# Patient Record
Sex: FEMALE | Race: WHITE | NOT HISPANIC OR LATINO | URBAN - METROPOLITAN AREA
[De-identification: names, ages, dates, MRNs, and addresses within clinical notes are randomized per-mention and may not be internally consistent; named-entity substitution may affect disease eponyms.]

---

## 2023-06-12 ENCOUNTER — HOSPITAL ENCOUNTER (OUTPATIENT)
Dept: RADIOLOGY | Facility: MEDICAL CENTER | Age: 43
End: 2023-06-12
Attending: CHIROPRACTOR
Payer: COMMERCIAL

## 2023-06-12 DIAGNOSIS — M25.561 CHRONIC PAIN OF RIGHT KNEE: ICD-10-CM

## 2023-06-12 DIAGNOSIS — G89.29 CHRONIC PAIN OF RIGHT KNEE: ICD-10-CM

## 2023-06-12 PROCEDURE — 73721 MRI JNT OF LWR EXTRE W/O DYE: CPT | Mod: RT

## 2024-07-15 ENCOUNTER — OFFICE VISIT (OUTPATIENT)
Dept: MEDICAL GROUP | Facility: PHYSICIAN GROUP | Age: 44
End: 2024-07-15
Payer: COMMERCIAL

## 2024-07-15 ENCOUNTER — HOSPITAL ENCOUNTER (OUTPATIENT)
Dept: LAB | Facility: MEDICAL CENTER | Age: 44
End: 2024-07-15
Attending: NURSE PRACTITIONER
Payer: COMMERCIAL

## 2024-07-15 VITALS
TEMPERATURE: 98.5 F | HEART RATE: 89 BPM | BODY MASS INDEX: 51.91 KG/M2 | HEIGHT: 63 IN | WEIGHT: 293 LBS | OXYGEN SATURATION: 96 % | DIASTOLIC BLOOD PRESSURE: 112 MMHG | SYSTOLIC BLOOD PRESSURE: 200 MMHG

## 2024-07-15 DIAGNOSIS — K90.41 GLUTEN INTOLERANCE: ICD-10-CM

## 2024-07-15 DIAGNOSIS — E66.01 MORBID (SEVERE) OBESITY DUE TO EXCESS CALORIES (HCC): ICD-10-CM

## 2024-07-15 DIAGNOSIS — R53.83 OTHER FATIGUE: ICD-10-CM

## 2024-07-15 DIAGNOSIS — Z76.89 ENCOUNTER TO ESTABLISH CARE: ICD-10-CM

## 2024-07-15 DIAGNOSIS — E66.01 MORBID OBESITY WITH BMI OF 50.0-59.9, ADULT (HCC): ICD-10-CM

## 2024-07-15 DIAGNOSIS — I10 PRIMARY HYPERTENSION: ICD-10-CM

## 2024-07-15 DIAGNOSIS — Z11.4 SCREENING FOR HIV (HUMAN IMMUNODEFICIENCY VIRUS): ICD-10-CM

## 2024-07-15 DIAGNOSIS — K21.9 GASTROESOPHAGEAL REFLUX DISEASE WITHOUT ESOPHAGITIS: ICD-10-CM

## 2024-07-15 DIAGNOSIS — D64.9 ANEMIA, UNSPECIFIED TYPE: ICD-10-CM

## 2024-07-15 DIAGNOSIS — R01.1 MURMUR: ICD-10-CM

## 2024-07-15 DIAGNOSIS — Z12.11 SCREENING FOR COLON CANCER: ICD-10-CM

## 2024-07-15 DIAGNOSIS — Z11.59 NEED FOR HEPATITIS C SCREENING TEST: ICD-10-CM

## 2024-07-15 DIAGNOSIS — Z80.0 FAMILY HISTORY OF COLON CANCER: ICD-10-CM

## 2024-07-15 DIAGNOSIS — R63.5 WEIGHT GAIN: ICD-10-CM

## 2024-07-15 DIAGNOSIS — R06.02 SHORTNESS OF BREATH: ICD-10-CM

## 2024-07-15 DIAGNOSIS — Z01.84 IMMUNITY STATUS TESTING: ICD-10-CM

## 2024-07-15 LAB
25(OH)D3 SERPL-MCNC: 9 NG/ML (ref 30–100)
ALBUMIN SERPL BCP-MCNC: 4.1 G/DL (ref 3.2–4.9)
ALBUMIN/GLOB SERPL: 1.6 G/DL
ALP SERPL-CCNC: 50 U/L (ref 30–99)
ALT SERPL-CCNC: 16 U/L (ref 2–50)
ANION GAP SERPL CALC-SCNC: 12 MMOL/L (ref 7–16)
AST SERPL-CCNC: 20 U/L (ref 12–45)
BASOPHILS # BLD AUTO: 0.6 % (ref 0–1.8)
BASOPHILS # BLD: 0.06 K/UL (ref 0–0.12)
BILIRUB SERPL-MCNC: 0.4 MG/DL (ref 0.1–1.5)
BUN SERPL-MCNC: 14 MG/DL (ref 8–22)
CALCIUM ALBUM COR SERPL-MCNC: 9.9 MG/DL (ref 8.5–10.5)
CALCIUM SERPL-MCNC: 10 MG/DL (ref 8.5–10.5)
CHLORIDE SERPL-SCNC: 105 MMOL/L (ref 96–112)
CHOLEST SERPL-MCNC: 203 MG/DL (ref 100–199)
CO2 SERPL-SCNC: 23 MMOL/L (ref 20–33)
CREAT SERPL-MCNC: 0.7 MG/DL (ref 0.5–1.4)
CREAT UR-MCNC: 71.88 MG/DL
EOSINOPHIL # BLD AUTO: 0.15 K/UL (ref 0–0.51)
EOSINOPHIL NFR BLD: 1.4 % (ref 0–6.9)
ERYTHROCYTE [DISTWIDTH] IN BLOOD BY AUTOMATED COUNT: 55.1 FL (ref 35.9–50)
EST. AVERAGE GLUCOSE BLD GHB EST-MCNC: 105 MG/DL
FERRITIN SERPL-MCNC: 111 NG/ML (ref 10–291)
FOLATE SERPL-MCNC: 3.5 NG/ML
GFR SERPLBLD CREATININE-BSD FMLA CKD-EPI: 110 ML/MIN/1.73 M 2
GLOBULIN SER CALC-MCNC: 2.5 G/DL (ref 1.9–3.5)
GLUCOSE SERPL-MCNC: 100 MG/DL (ref 65–99)
HBA1C MFR BLD: 5.3 % (ref 4–5.6)
HCT VFR BLD AUTO: 46.2 % (ref 37–47)
HCV AB SER QL: NORMAL
HDLC SERPL-MCNC: 44 MG/DL
HGB BLD-MCNC: 14.9 G/DL (ref 12–16)
HIV 1+2 AB+HIV1 P24 AG SERPL QL IA: NORMAL
IMM GRANULOCYTES # BLD AUTO: 0.03 K/UL (ref 0–0.11)
IMM GRANULOCYTES NFR BLD AUTO: 0.3 % (ref 0–0.9)
IRON SATN MFR SERPL: 23 % (ref 15–55)
IRON SERPL-MCNC: 81 UG/DL (ref 40–170)
LDLC SERPL CALC-MCNC: 131 MG/DL
LYMPHOCYTES # BLD AUTO: 2.8 K/UL (ref 1–4.8)
LYMPHOCYTES NFR BLD: 26.4 % (ref 22–41)
MCH RBC QN AUTO: 33.8 PG (ref 27–33)
MCHC RBC AUTO-ENTMCNC: 32.3 G/DL (ref 32.2–35.5)
MCV RBC AUTO: 104.8 FL (ref 81.4–97.8)
MICROALBUMIN UR-MCNC: <1.2 MG/DL
MICROALBUMIN/CREAT UR: NORMAL MG/G (ref 0–30)
MONOCYTES # BLD AUTO: 0.8 K/UL (ref 0–0.85)
MONOCYTES NFR BLD AUTO: 7.5 % (ref 0–13.4)
NEUTROPHILS # BLD AUTO: 6.76 K/UL (ref 1.82–7.42)
NEUTROPHILS NFR BLD: 63.8 % (ref 44–72)
NRBC # BLD AUTO: 0 K/UL
NRBC BLD-RTO: 0 /100 WBC (ref 0–0.2)
PLATELET # BLD AUTO: 198 K/UL (ref 164–446)
PMV BLD AUTO: 11.4 FL (ref 9–12.9)
POTASSIUM SERPL-SCNC: 4.3 MMOL/L (ref 3.6–5.5)
PROT SERPL-MCNC: 6.6 G/DL (ref 6–8.2)
RBC # BLD AUTO: 4.41 M/UL (ref 4.2–5.4)
SODIUM SERPL-SCNC: 140 MMOL/L (ref 135–145)
TIBC SERPL-MCNC: 356 UG/DL (ref 250–450)
TRIGL SERPL-MCNC: 139 MG/DL (ref 0–149)
TSH SERPL DL<=0.005 MIU/L-ACNC: 1.58 UIU/ML (ref 0.38–5.33)
UIBC SERPL-MCNC: 275 UG/DL (ref 110–370)
VIT B12 SERPL-MCNC: 398 PG/ML (ref 211–911)
WBC # BLD AUTO: 10.6 K/UL (ref 4.8–10.8)

## 2024-07-15 PROCEDURE — 83036 HEMOGLOBIN GLYCOSYLATED A1C: CPT

## 2024-07-15 PROCEDURE — 82043 UR ALBUMIN QUANTITATIVE: CPT

## 2024-07-15 PROCEDURE — 87389 HIV-1 AG W/HIV-1&-2 AB AG IA: CPT

## 2024-07-15 PROCEDURE — 82306 VITAMIN D 25 HYDROXY: CPT

## 2024-07-15 PROCEDURE — 82607 VITAMIN B-12: CPT

## 2024-07-15 PROCEDURE — 82728 ASSAY OF FERRITIN: CPT

## 2024-07-15 PROCEDURE — 86803 HEPATITIS C AB TEST: CPT

## 2024-07-15 PROCEDURE — 82570 ASSAY OF URINE CREATININE: CPT

## 2024-07-15 PROCEDURE — 80061 LIPID PANEL: CPT

## 2024-07-15 PROCEDURE — 83540 ASSAY OF IRON: CPT

## 2024-07-15 PROCEDURE — 86706 HEP B SURFACE ANTIBODY: CPT

## 2024-07-15 PROCEDURE — 80053 COMPREHEN METABOLIC PANEL: CPT

## 2024-07-15 PROCEDURE — 3080F DIAST BP >= 90 MM HG: CPT | Performed by: NURSE PRACTITIONER

## 2024-07-15 PROCEDURE — 99204 OFFICE O/P NEW MOD 45 MIN: CPT | Performed by: NURSE PRACTITIONER

## 2024-07-15 PROCEDURE — 86258 DGP ANTIBODY EACH IG CLASS: CPT

## 2024-07-15 PROCEDURE — 85025 COMPLETE CBC W/AUTO DIFF WBC: CPT

## 2024-07-15 PROCEDURE — 84443 ASSAY THYROID STIM HORMONE: CPT

## 2024-07-15 PROCEDURE — 86364 TISS TRNSGLTMNASE EA IG CLAS: CPT | Mod: 91

## 2024-07-15 PROCEDURE — 82746 ASSAY OF FOLIC ACID SERUM: CPT

## 2024-07-15 PROCEDURE — 93000 ELECTROCARDIOGRAM COMPLETE: CPT | Performed by: NURSE PRACTITIONER

## 2024-07-15 PROCEDURE — 3077F SYST BP >= 140 MM HG: CPT | Performed by: NURSE PRACTITIONER

## 2024-07-15 PROCEDURE — 36415 COLL VENOUS BLD VENIPUNCTURE: CPT

## 2024-07-15 PROCEDURE — 83550 IRON BINDING TEST: CPT

## 2024-07-15 RX ORDER — AMLODIPINE BESYLATE AND BENAZEPRIL HYDROCHLORIDE 5; 10 MG/1; MG/1
1 CAPSULE ORAL DAILY
Qty: 30 CAPSULE | Refills: 2 | Status: SHIPPED | OUTPATIENT
Start: 2024-07-15

## 2024-07-15 RX ORDER — LANSOPRAZOLE 30 MG/1
30 CAPSULE, DELAYED RELEASE ORAL DAILY
COMMUNITY

## 2024-07-15 RX ORDER — CALCIUM CARBONATE 500 MG/1
TABLET, CHEWABLE ORAL DAILY
COMMUNITY

## 2024-07-16 LAB
GLIADIN IGA SER IA-ACNC: <0.72 FLU (ref 0–4.99)
HBV SURFACE AB SERPL IA-ACNC: <3.5 MIU/ML (ref 0–10)
TTG IGA SER IA-ACNC: <1.02 FLU (ref 0–4.99)

## 2024-07-17 LAB
GLIADIN IGG SER IA-ACNC: <0.56 FLU (ref 0–4.99)
TTG IGG SER IA-ACNC: <0.82 FLU (ref 0–4.99)

## 2024-07-22 ENCOUNTER — OFFICE VISIT (OUTPATIENT)
Dept: MEDICAL GROUP | Facility: PHYSICIAN GROUP | Age: 44
End: 2024-07-22
Payer: COMMERCIAL

## 2024-07-22 VITALS
OXYGEN SATURATION: 98 % | DIASTOLIC BLOOD PRESSURE: 112 MMHG | SYSTOLIC BLOOD PRESSURE: 152 MMHG | WEIGHT: 293 LBS | HEIGHT: 62 IN | HEART RATE: 88 BPM | TEMPERATURE: 98 F | BODY MASS INDEX: 53.92 KG/M2

## 2024-07-22 DIAGNOSIS — E53.8 FOLIC ACID DEFICIENCY: ICD-10-CM

## 2024-07-22 DIAGNOSIS — E78.5 DYSLIPIDEMIA: ICD-10-CM

## 2024-07-22 DIAGNOSIS — E55.9 VITAMIN D DEFICIENCY: ICD-10-CM

## 2024-07-22 DIAGNOSIS — R73.01 ELEVATED FASTING GLUCOSE: ICD-10-CM

## 2024-07-22 DIAGNOSIS — I10 PRIMARY HYPERTENSION: ICD-10-CM

## 2024-07-22 PROCEDURE — 3077F SYST BP >= 140 MM HG: CPT | Performed by: NURSE PRACTITIONER

## 2024-07-22 PROCEDURE — 99214 OFFICE O/P EST MOD 30 MIN: CPT | Performed by: NURSE PRACTITIONER

## 2024-07-22 PROCEDURE — 3080F DIAST BP >= 90 MM HG: CPT | Performed by: NURSE PRACTITIONER

## 2024-07-22 RX ORDER — ERGOCALCIFEROL 1.25 MG/1
50000 CAPSULE ORAL
Qty: 12 CAPSULE | Refills: 0 | Status: SHIPPED | OUTPATIENT
Start: 2024-07-22

## 2024-07-22 RX ORDER — FOLIC ACID 1 MG/1
1 TABLET ORAL DAILY
Qty: 90 TABLET | Refills: 0 | Status: SHIPPED | OUTPATIENT
Start: 2024-07-22

## 2024-07-22 ASSESSMENT — PATIENT HEALTH QUESTIONNAIRE - PHQ9: CLINICAL INTERPRETATION OF PHQ2 SCORE: 0

## 2024-07-22 ASSESSMENT — FIBROSIS 4 INDEX: FIB4 SCORE: 1.09

## 2024-08-12 ENCOUNTER — APPOINTMENT (OUTPATIENT)
Dept: CARDIOLOGY | Facility: MEDICAL CENTER | Age: 44
End: 2024-08-12
Attending: NURSE PRACTITIONER
Payer: COMMERCIAL

## 2024-08-23 LAB
GLIADIN IGA SER IA-ACNC: <0.72 FLU (ref 0–4.99)
GLIADIN IGG SER IA-ACNC: <0.56 FLU (ref 0–4.99)
TTG IGG SER IA-ACNC: <0.82 FLU (ref 0–4.99)

## 2024-08-26 ENCOUNTER — APPOINTMENT (OUTPATIENT)
Dept: CARDIOLOGY | Facility: MEDICAL CENTER | Age: 44
End: 2024-08-26
Attending: NURSE PRACTITIONER
Payer: COMMERCIAL

## 2024-08-26 ENCOUNTER — TELEPHONE (OUTPATIENT)
Dept: HEALTH INFORMATION MANAGEMENT | Facility: OTHER | Age: 44
End: 2024-08-26

## 2024-08-26 DIAGNOSIS — R01.1 MURMUR: ICD-10-CM

## 2024-08-26 LAB
LV EJECT FRACT  99904: 58
LV EJECT FRACT MOD 2C 99903: 67.29
LV EJECT FRACT MOD 4C 99902: 47.08
LV EJECT FRACT MOD BP 99901: 58.31

## 2024-08-26 PROCEDURE — 93306 TTE W/DOPPLER COMPLETE: CPT

## 2024-09-05 ENCOUNTER — OFFICE VISIT (OUTPATIENT)
Dept: MEDICAL GROUP | Facility: PHYSICIAN GROUP | Age: 44
End: 2024-09-05
Payer: COMMERCIAL

## 2024-09-05 VITALS
DIASTOLIC BLOOD PRESSURE: 102 MMHG | TEMPERATURE: 98.6 F | OXYGEN SATURATION: 96 % | BODY MASS INDEX: 53.92 KG/M2 | HEIGHT: 62 IN | WEIGHT: 293 LBS | SYSTOLIC BLOOD PRESSURE: 152 MMHG | HEART RATE: 82 BPM

## 2024-09-05 DIAGNOSIS — I10 PRIMARY HYPERTENSION: ICD-10-CM

## 2024-09-05 DIAGNOSIS — I51.7 LEFT VENTRICULAR HYPERTROPHY: ICD-10-CM

## 2024-09-05 PROCEDURE — 3080F DIAST BP >= 90 MM HG: CPT | Performed by: NURSE PRACTITIONER

## 2024-09-05 PROCEDURE — 3077F SYST BP >= 140 MM HG: CPT | Performed by: NURSE PRACTITIONER

## 2024-09-05 PROCEDURE — 99214 OFFICE O/P EST MOD 30 MIN: CPT | Performed by: NURSE PRACTITIONER

## 2024-09-05 RX ORDER — AMLODIPINE AND BENAZEPRIL HYDROCHLORIDE 10; 20 MG/1; MG/1
1 CAPSULE ORAL DAILY
Qty: 30 CAPSULE | Refills: 2 | Status: SHIPPED | OUTPATIENT
Start: 2024-09-05

## 2024-09-05 ASSESSMENT — FIBROSIS 4 INDEX: FIB4 SCORE: 1.09

## 2024-09-05 NOTE — ASSESSMENT & PLAN NOTE
Recent echocardiogram shows left ejection fraction of 58% with moderate left ventricular hypertrophy.  There is also grade 1 diastolic dysfunction.  Discussed and reviewed her results.  Plan to get better control of her blood pressure.

## 2024-09-05 NOTE — ASSESSMENT & PLAN NOTE
Blood pressure today 154/98.  On repeat blood pressure is 152/102. She is taking her amlodipine-benazepril 5-10 mg daily dose at noon with her last dose yesterday.  She has not had her dose today.  She is not checking her blood pressure at home.  Denies chest pain, shortness of breath or dizziness.  She also had an echocardiogram that she would like to discuss and review her results today.  Blood pressure not controlled.  Plan to increase amlodipine-benazepril to 10-20 mg daily.  Plan for MA BP check in 2 weeks and return in 1 month for follow-up.

## 2024-09-05 NOTE — PROGRESS NOTES
Subjective:     CC: hypertension follow-up    HPI:   Stacy presents today with the following:    Hypertension  Blood pressure today 154/98.  On repeat blood pressure is 152/102. She is taking her amlodipine-benazepril 5-10 mg daily dose at noon with her last dose yesterday.  She has not had her dose today.  She is not checking her blood pressure at home.  Denies chest pain, shortness of breath or dizziness.  She also had an echocardiogram that she would like to discuss and review her results today.  Blood pressure not controlled.  Plan to increase amlodipine-benazepril to 10-20 mg daily.  Plan for MA BP check in 2 weeks and return in 1 month for follow-up.    Left ventricular hypertrophy  Recent echocardiogram shows left ejection fraction of 58% with moderate left ventricular hypertrophy.  There is also grade 1 diastolic dysfunction.  Discussed and reviewed her results.  Plan to get better control of her blood pressure.      Past Medical History:   Diagnosis Date    Hypertension        Social History     Tobacco Use    Smoking status: Never    Smokeless tobacco: Never   Vaping Use    Vaping status: Never Used   Substance Use Topics    Alcohol use: Not Currently    Drug use: Not Currently       Current Outpatient Medications Ordered in Epic   Medication Sig Dispense Refill    amlodipine-benazepril (LOTREL) 10-20 MG per capsule Take 1 Capsule by mouth every day. 30 Capsule 2    ergocalciferol (DRISDOL) 94776 UNIT capsule Take 1 Capsule by mouth every 7 days. 12 Capsule 0    folic acid (FOLVITE) 1 MG Tab Take 1 Tablet by mouth every day. 90 Tablet 0    Ferrous Gluconate (IRON 27 PO) Take  by mouth.      lansoprazole (PREVACID) 30 MG CAPSULE DELAYED RELEASE Take 30 mg by mouth every day.      calcium carbonate (TUMS) 500 MG Chew Tab Chew every day. 8-10/day       No current [facility]-ordered facility-administered medications on file.       Allergies:  Sulfa drugs    Health Maintenance: Reviewed      Objective:     Vital  "signs reviewed  Exam:  BP (!) 152/102 (BP Location: Right arm, Patient Position: Sitting)   Pulse 82   Temp 37 °C (98.6 °F) (Temporal)   Ht 1.575 m (5' 2\")   Wt (!) 136 kg (299 lb)   LMP 08/22/2024 (Approximate)   SpO2 96%   BMI 54.69 kg/m²  Body mass index is 54.69 kg/m².    Gen: Alert and oriented, No apparent distress.  Increased body habitus.  Lungs: Normal effort, CTA bilaterally, no wheezes, rhonchi, or rales  CV: Regular rate and rhythm. No murmurs, rubs, or gallops.      Assessment & Plan:     43 y.o. female with the following -     1. Primary hypertension  Chronic exacerbated problem.  Increasing amlodipine-benazepril to 10-20 mg daily.  She can start taking the new increased dose today by doubling her current prescription.  Would like her to return in 2 weeks for MA BP check and return in 1 month for follow-up.  - amlodipine-benazepril (LOTREL) 10-20 MG per capsule; Take 1 Capsule by mouth every day.  Dispense: 30 Capsule; Refill: 2    2. Left ventricular hypertrophy  Acute uncomplicated problem.  New problem to examiner.  Questions answered.  Recommend blood pressure control.  See #1 above.      Return in about 4 weeks (around 10/3/2024) for Hypertension, MA visit for BP check 2 week.    Please note that this dictation was created using voice recognition software. I have made every reasonable attempt to correct obvious errors, but I expect that there are errors of grammar and possibly content that I did not discover before finalizing the note.        "

## 2024-10-02 ENCOUNTER — OFFICE VISIT (OUTPATIENT)
Dept: MEDICAL GROUP | Facility: PHYSICIAN GROUP | Age: 44
End: 2024-10-02
Payer: COMMERCIAL

## 2024-10-02 VITALS
WEIGHT: 293 LBS | HEIGHT: 62 IN | OXYGEN SATURATION: 94 % | BODY MASS INDEX: 53.92 KG/M2 | SYSTOLIC BLOOD PRESSURE: 146 MMHG | DIASTOLIC BLOOD PRESSURE: 88 MMHG | HEART RATE: 80 BPM | TEMPERATURE: 98.2 F

## 2024-10-02 DIAGNOSIS — I10 PRIMARY HYPERTENSION: ICD-10-CM

## 2024-10-02 PROCEDURE — 3079F DIAST BP 80-89 MM HG: CPT | Performed by: NURSE PRACTITIONER

## 2024-10-02 PROCEDURE — 3077F SYST BP >= 140 MM HG: CPT | Performed by: NURSE PRACTITIONER

## 2024-10-02 PROCEDURE — 99214 OFFICE O/P EST MOD 30 MIN: CPT | Performed by: NURSE PRACTITIONER

## 2024-10-02 RX ORDER — HYDROCHLOROTHIAZIDE 25 MG/1
25 TABLET ORAL DAILY
Qty: 30 TABLET | Refills: 2 | Status: SHIPPED | OUTPATIENT
Start: 2024-10-02

## 2024-10-02 ASSESSMENT — FIBROSIS 4 INDEX: FIB4 SCORE: 1.09

## 2024-10-23 DIAGNOSIS — E53.8 FOLIC ACID DEFICIENCY: ICD-10-CM

## 2024-10-23 RX ORDER — FOLIC ACID 1 MG/1
1 TABLET ORAL DAILY
Qty: 90 TABLET | Refills: 0 | Status: SHIPPED | OUTPATIENT
Start: 2024-10-23

## 2024-11-04 ENCOUNTER — OFFICE VISIT (OUTPATIENT)
Dept: MEDICAL GROUP | Facility: PHYSICIAN GROUP | Age: 44
End: 2024-11-04
Payer: COMMERCIAL

## 2024-11-04 VITALS
OXYGEN SATURATION: 95 % | BODY MASS INDEX: 53 KG/M2 | DIASTOLIC BLOOD PRESSURE: 90 MMHG | HEIGHT: 62 IN | SYSTOLIC BLOOD PRESSURE: 150 MMHG | WEIGHT: 288 LBS | HEART RATE: 99 BPM | TEMPERATURE: 99.6 F

## 2024-11-04 DIAGNOSIS — I10 PRIMARY HYPERTENSION: ICD-10-CM

## 2024-11-04 DIAGNOSIS — E55.9 VITAMIN D DEFICIENCY: ICD-10-CM

## 2024-11-04 DIAGNOSIS — E53.8 FOLIC ACID DEFICIENCY: ICD-10-CM

## 2024-11-04 PROCEDURE — 3077F SYST BP >= 140 MM HG: CPT | Performed by: NURSE PRACTITIONER

## 2024-11-04 PROCEDURE — 3080F DIAST BP >= 90 MM HG: CPT | Performed by: NURSE PRACTITIONER

## 2024-11-04 PROCEDURE — 99214 OFFICE O/P EST MOD 30 MIN: CPT | Performed by: NURSE PRACTITIONER

## 2024-11-04 RX ORDER — AMLODIPINE AND BENAZEPRIL HYDROCHLORIDE 10; 40 MG/1; MG/1
1 CAPSULE ORAL DAILY
Qty: 90 CAPSULE | Refills: 1 | Status: SHIPPED | OUTPATIENT
Start: 2024-11-04

## 2024-11-04 ASSESSMENT — FIBROSIS 4 INDEX: FIB4 SCORE: 1.09

## 2024-11-04 NOTE — ASSESSMENT & PLAN NOTE
Blood pressure today 158/92.  Repeat blood pressure 150/90.  She has been taking hydrochlorothiazide 25 mg daily and amlodipine-benazepril 10-20 mg daily.  She has not been able to come in for MA blood pressure check.  She does not check her blood pressure at home.  She is not having any chest pain, shortness of breath, headaches or blurry vision. She has noticed dizziness since start hctz that is intermittent.  Dizziness starts before she takes her blood pressure medication. Her feet and hands are less swollen. Reports mother has history of Fibromuscular dysplasia with stenting that resolved hypertension.  Plan to check renal ultrasound and increase benazepril to 40 mg

## 2024-11-04 NOTE — PROGRESS NOTES
Subjective:     CC: hypertension follow-up     HPI:   Stacy presents today with the following:    Hypertension  Blood pressure today 158/92.  Repeat blood pressure 150/90.  She has been taking hydrochlorothiazide 25 mg daily and amlodipine-benazepril 10-20 mg daily.  She has not been able to come in for MA blood pressure check.  She does not check her blood pressure at home.  She is not having any chest pain, shortness of breath, headaches or blurry vision. She has noticed dizziness since start hctz that is intermittent.  Dizziness starts before she takes her blood pressure medication. Her feet and hands are less swollen. Reports mother has history of Fibromuscular dysplasia with stenting that resolved hypertension.  Plan to check renal ultrasound and increase benazepril to 40 mg    Past Medical History:   Diagnosis Date    Hypertension        Social History     Tobacco Use    Smoking status: Never    Smokeless tobacco: Never   Vaping Use    Vaping status: Never Used   Substance Use Topics    Alcohol use: Not Currently    Drug use: Not Currently       Current Outpatient Medications Ordered in Epic   Medication Sig Dispense Refill    amlodipine-benazepril (LOTREL) 10-40 MG per capsule Take 1 Capsule by mouth every day. 90 Capsule 1    folic acid (FOLVITE) 1 MG Tab Take 1 Tablet by mouth every day. 90 Tablet 0    hydroCHLOROthiazide 25 MG Tab Take 1 Tablet by mouth every day. 30 Tablet 2    Ferrous Gluconate (IRON 27 PO) Take  by mouth.      lansoprazole (PREVACID) 30 MG CAPSULE DELAYED RELEASE Take 30 mg by mouth every day.      calcium carbonate (TUMS) 500 MG Chew Tab Chew every day. 8-10/day      ergocalciferol (DRISDOL) 37974 UNIT capsule Take 1 Capsule by mouth every 7 days. (Patient not taking: Reported on 11/4/2024) 12 Capsule 0     No current Epic-ordered facility-administered medications on file.       Allergies:  Sulfa drugs    Health Maintenance: Reviewed       Objective:     Vital signs  "reviewed  Exam:  BP (!) 150/90 (BP Location: Right arm, Patient Position: Sitting)   Pulse 99   Temp 37.6 °C (99.6 °F) (Temporal)   Ht 1.575 m (5' 2\")   Wt (!) 131 kg (288 lb)   LMP 10/21/2024 (Approximate)   SpO2 95%   BMI 52.68 kg/m²  Body mass index is 52.68 kg/m².    Gen: Alert and oriented, No apparent distress.  Eyes:   Lids normal. Glasses in place.   Lungs: Normal effort, CTA bilaterally, no wheezes, rhonchi, or rales  CV: Regular rate and rhythm. No murmurs, rubs, or gallops.        Assessment & Plan:     43 y.o. female with the following -     1. Primary hypertension  Chronic exacerbated problem.  Checking renal artery ultrasound.  Continue hydrochlorothiazide 25 mg daily.  Increasing amlodipine-benazepril to 10-40 mg daily.  Encourage patient to return for MA BP check.  Due for updated labs and follow-up in 1 month.  .  - US-RENAL ARTERY DUPLEX COMP; Future  - amlodipine-benazepril (LOTREL) 10-40 MG per capsule; Take 1 Capsule by mouth every day.  Dispense: 90 Capsule; Refill: 1  - Basic Metabolic Panel; Future    2. Folic acid deficiency  Chronic stable problem.  Continue folic acid 1 mg daily.  Due for updated labs  - FOLATE; Future    3. Vitamin D deficiency  Chronic exacerbated problem.  She completed 12-week course of ergocalciferol.  Check updated labs.  - VITAMIN D,25 HYDROXY (DEFICIENCY); Future      Return in about 4 weeks (around 12/2/2024) for Hypertension.    Please note that this dictation was created using voice recognition software. I have made every reasonable attempt to correct obvious errors, but I expect that there are errors of grammar and possibly content that I did not discover before finalizing the note.        "

## 2024-11-25 DIAGNOSIS — H52.03 HYPERMETROPIA OF BOTH EYES: ICD-10-CM

## 2024-11-25 DIAGNOSIS — H52.223 REGULAR ASTIGMATISM OF BOTH EYES: ICD-10-CM

## 2024-11-25 DIAGNOSIS — H52.4 PRESBYOPIA: ICD-10-CM

## 2024-11-26 NOTE — PROGRESS NOTES
Referral departing requesting updated referral to [facility]. Dr. Alegria referred for H52.4, H52.03 and H52.223.

## 2024-12-09 ENCOUNTER — HOSPITAL ENCOUNTER (OUTPATIENT)
Dept: LAB | Facility: MEDICAL CENTER | Age: 44
End: 2024-12-09
Attending: NURSE PRACTITIONER
Payer: COMMERCIAL

## 2024-12-09 DIAGNOSIS — E53.8 FOLIC ACID DEFICIENCY: ICD-10-CM

## 2024-12-09 DIAGNOSIS — I10 PRIMARY HYPERTENSION: ICD-10-CM

## 2024-12-09 DIAGNOSIS — E55.9 VITAMIN D DEFICIENCY: ICD-10-CM

## 2024-12-09 LAB
BASOPHILS # BLD AUTO: 0.6 % (ref 0–1.8)
BASOPHILS # BLD: 0.05 K/UL (ref 0–0.12)
EOSINOPHIL # BLD AUTO: 0.13 K/UL (ref 0–0.51)
EOSINOPHIL NFR BLD: 1.6 % (ref 0–6.9)
ERYTHROCYTE [DISTWIDTH] IN BLOOD BY AUTOMATED COUNT: 52.4 FL (ref 35.9–50)
HCT VFR BLD AUTO: 46.2 % (ref 37–47)
HGB BLD-MCNC: 15.5 G/DL (ref 12–16)
IMM GRANULOCYTES # BLD AUTO: 0.03 K/UL (ref 0–0.11)
IMM GRANULOCYTES NFR BLD AUTO: 0.4 % (ref 0–0.9)
LYMPHOCYTES # BLD AUTO: 2.99 K/UL (ref 1–4.8)
LYMPHOCYTES NFR BLD: 36.4 % (ref 22–41)
MCH RBC QN AUTO: 33.8 PG (ref 27–33)
MCHC RBC AUTO-ENTMCNC: 33.5 G/DL (ref 32.2–35.5)
MCV RBC AUTO: 100.7 FL (ref 81.4–97.8)
MONOCYTES # BLD AUTO: 0.52 K/UL (ref 0–0.85)
MONOCYTES NFR BLD AUTO: 6.3 % (ref 0–13.4)
NEUTROPHILS # BLD AUTO: 4.5 K/UL (ref 1.82–7.42)
NEUTROPHILS NFR BLD: 54.7 % (ref 44–72)
NRBC # BLD AUTO: 0 K/UL
NRBC BLD-RTO: 0 /100 WBC (ref 0–0.2)
PLATELET # BLD AUTO: 181 K/UL (ref 164–446)
PMV BLD AUTO: 11.3 FL (ref 9–12.9)
RBC # BLD AUTO: 4.59 M/UL (ref 4.2–5.4)
WBC # BLD AUTO: 8.2 K/UL (ref 4.8–10.8)

## 2024-12-09 PROCEDURE — 82306 VITAMIN D 25 HYDROXY: CPT

## 2024-12-09 PROCEDURE — 80048 BASIC METABOLIC PNL TOTAL CA: CPT

## 2024-12-09 PROCEDURE — 85025 COMPLETE CBC W/AUTO DIFF WBC: CPT

## 2024-12-09 PROCEDURE — 82746 ASSAY OF FOLIC ACID SERUM: CPT

## 2024-12-09 PROCEDURE — 36415 COLL VENOUS BLD VENIPUNCTURE: CPT

## 2024-12-09 PROCEDURE — 83735 ASSAY OF MAGNESIUM: CPT

## 2024-12-10 DIAGNOSIS — E55.9 VITAMIN D DEFICIENCY: ICD-10-CM

## 2024-12-10 LAB
25(OH)D3 SERPL-MCNC: 16 NG/ML (ref 30–100)
ANION GAP SERPL CALC-SCNC: 9 MMOL/L (ref 7–16)
BUN SERPL-MCNC: 9 MG/DL (ref 8–22)
CALCIUM SERPL-MCNC: 9.6 MG/DL (ref 8.5–10.5)
CHLORIDE SERPL-SCNC: 106 MMOL/L (ref 96–112)
CO2 SERPL-SCNC: 26 MMOL/L (ref 20–33)
CREAT SERPL-MCNC: 0.53 MG/DL (ref 0.5–1.4)
FOLATE SERPL-MCNC: 27.8 NG/ML
GFR SERPLBLD CREATININE-BSD FMLA CKD-EPI: 117 ML/MIN/1.73 M 2
GLUCOSE SERPL-MCNC: 100 MG/DL (ref 65–99)
MAGNESIUM SERPL-MCNC: 1.9 MG/DL (ref 1.5–2.5)
POTASSIUM SERPL-SCNC: 4.3 MMOL/L (ref 3.6–5.5)
SODIUM SERPL-SCNC: 141 MMOL/L (ref 135–145)

## 2024-12-10 RX ORDER — ERGOCALCIFEROL 1.25 MG/1
50000 CAPSULE ORAL
Qty: 12 CAPSULE | Refills: 0 | Status: SHIPPED | OUTPATIENT
Start: 2024-12-10

## 2024-12-11 NOTE — PROGRESS NOTES
Recent vitamin D level 16.  Continue another 12-week course of ergocalciferol 50,000 units weekly.

## 2024-12-23 ENCOUNTER — HOSPITAL ENCOUNTER (OUTPATIENT)
Dept: RADIOLOGY | Facility: MEDICAL CENTER | Age: 44
End: 2024-12-23
Attending: NURSE PRACTITIONER
Payer: COMMERCIAL

## 2024-12-23 DIAGNOSIS — I10 PRIMARY HYPERTENSION: ICD-10-CM

## 2024-12-23 PROCEDURE — 93975 VASCULAR STUDY: CPT

## 2025-01-03 DIAGNOSIS — I10 PRIMARY HYPERTENSION: ICD-10-CM

## 2025-01-03 NOTE — TELEPHONE ENCOUNTER
Received request via: Patient    Was the patient seen in the last year in this department? Yes    Does the patient have an active prescription (recently filled or refills available) for medication(s) requested? No    Pharmacy Name: [facility]    Does the patient have [facility] and need 100-day supply? (This applies to ALL medications) Patient does not have SCP

## 2025-01-06 RX ORDER — HYDROCHLOROTHIAZIDE 25 MG/1
25 TABLET ORAL DAILY
Qty: 30 TABLET | Refills: 0 | Status: SHIPPED | OUTPATIENT
Start: 2025-01-06

## 2025-01-06 NOTE — TELEPHONE ENCOUNTER
Requested Prescriptions     Signed Prescriptions Disp Refills    hydroCHLOROthiazide 25 MG Tab 30 Tablet 0     Sig: TAKE ONE TABLET BY MOUTH ONE TIME DAILY     Authorizing Provider: LORIN RUSHING A.P.R.N.

## 2025-01-13 ENCOUNTER — OFFICE VISIT (OUTPATIENT)
Dept: MEDICAL GROUP | Facility: PHYSICIAN GROUP | Age: 45
End: 2025-01-13
Payer: COMMERCIAL

## 2025-01-13 VITALS
WEIGHT: 283 LBS | DIASTOLIC BLOOD PRESSURE: 84 MMHG | BODY MASS INDEX: 52.08 KG/M2 | TEMPERATURE: 97.1 F | OXYGEN SATURATION: 99 % | SYSTOLIC BLOOD PRESSURE: 140 MMHG | HEIGHT: 62 IN | HEART RATE: 91 BPM

## 2025-01-13 DIAGNOSIS — E66.01 MORBID OBESITY WITH BMI OF 50.0-59.9, ADULT (HCC): ICD-10-CM

## 2025-01-13 DIAGNOSIS — I10 PRIMARY HYPERTENSION: ICD-10-CM

## 2025-01-13 DIAGNOSIS — R22.32 MASS OF LEFT AXILLA: ICD-10-CM

## 2025-01-13 DIAGNOSIS — E55.9 VITAMIN D DEFICIENCY: ICD-10-CM

## 2025-01-13 DIAGNOSIS — E66.01 SEVERE OBESITY (HCC): ICD-10-CM

## 2025-01-13 DIAGNOSIS — Z71.2 ENCOUNTER TO DISCUSS TEST RESULTS: ICD-10-CM

## 2025-01-13 DIAGNOSIS — Z01.818 PRE-OPERATIVE CLEARANCE: ICD-10-CM

## 2025-01-13 PROCEDURE — 3077F SYST BP >= 140 MM HG: CPT | Performed by: NURSE PRACTITIONER

## 2025-01-13 PROCEDURE — 99214 OFFICE O/P EST MOD 30 MIN: CPT | Mod: 25 | Performed by: NURSE PRACTITIONER

## 2025-01-13 PROCEDURE — 99459 PELVIC EXAMINATION: CPT | Performed by: NURSE PRACTITIONER

## 2025-01-13 PROCEDURE — 3079F DIAST BP 80-89 MM HG: CPT | Performed by: NURSE PRACTITIONER

## 2025-01-13 RX ORDER — FAMOTIDINE 40 MG/1
60 TABLET, FILM COATED ORAL
COMMUNITY
Start: 2024-12-03

## 2025-01-13 ASSESSMENT — FIBROSIS 4 INDEX: FIB4 SCORE: 1.22

## 2025-01-13 ASSESSMENT — PATIENT HEALTH QUESTIONNAIRE - PHQ9: CLINICAL INTERPRETATION OF PHQ2 SCORE: 0

## 2025-01-13 NOTE — ASSESSMENT & PLAN NOTE
BP today 146/84.  Repeat /84. Continues amlodipine-benazepril 10-40 mg daily and hydrochlorothiazide 25 mg daily. She last took her medication last night 1800.  Recent BMP WNL. Denies chest pain, shortness of breath, dizziness or headaches.

## 2025-01-13 NOTE — ASSESSMENT & PLAN NOTE
Recent vitamin D level 16.  She will continue additional 12-week course of ergocalciferol 50,000 units weekly then repeat vitamin D level.

## 2025-01-13 NOTE — PROGRESS NOTES
Subjective:     CC: Hypertension follow-up    HPI:   Stacy presents today with the following:    Medical clearance  She is scheduled for a colonoscopy with anesthesia on 3/27/2025 by Dr. Dakotah camp.  GI consultants requesting a medical clearance.  Recent labs 12/2024.  She does not smoke.  Declines influenza vaccine and COVID-vaccine.    Hypertension  BP today 146/84.  Repeat /84. Continues amlodipine-benazepril 10-40 mg daily and hydrochlorothiazide 25 mg daily. She last took her medication last night 1800.  Recent BMP WNL. Denies chest pain, shortness of breath, dizziness or headaches.    Vitamin D deficiency  Recent vitamin D level 16.  She will continue additional 12-week course of ergocalciferol 50,000 units weekly then repeat vitamin D level.    Lump in axilla  She noticed a lump to left axilla a few weeks ago. No pain, nipple discharge, fever or chills. The lump is increasing in size.       Past Medical History:   Diagnosis Date    Hypertension        Social History     Tobacco Use    Smoking status: Never    Smokeless tobacco: Never   Vaping Use    Vaping status: Never Used   Substance Use Topics    Alcohol use: Not Currently    Drug use: Not Currently       Current Outpatient Medications Ordered in Epic   Medication Sig Dispense Refill    famotidine (PEPCID) 40 MG Tab 60 Tablets.      hydroCHLOROthiazide 25 MG Tab TAKE ONE TABLET BY MOUTH ONE TIME DAILY 30 Tablet 0    ergocalciferol (DRISDOL) 82351 UNIT capsule Take 1 Capsule by mouth every 7 days. 12 Capsule 0    amlodipine-benazepril (LOTREL) 10-40 MG per capsule Take 1 Capsule by mouth every day. 90 Capsule 1    Ferrous Gluconate (IRON 27 PO) Take  by mouth.      lansoprazole (PREVACID) 30 MG CAPSULE DELAYED RELEASE Take 30 mg by mouth every day.      calcium carbonate (TUMS) 500 MG Chew Tab Chew every day. 8-10/day       No current [facility]-ordered facility-administered medications on file.       Allergies:  Sulfa drugs    Health Maintenance:  "Reviewed       Objective:     Vital signs reviewed  Exam:  BP (!) 140/84 (BP Location: Right arm, Patient Position: Sitting)   Pulse 91   Temp 36.2 °C (97.1 °F) (Temporal)   Ht 1.575 m (5' 2\")   Wt (!) 128 kg (283 lb)   LMP 12/26/2024 (Approximate) Comment: couple weeks ago per pt  SpO2 99%   BMI 51.76 kg/m²  Body mass index is 51.76 kg/m².    Gen: Alert and oriented, No apparent distress.  Neck: Neck is supple without lymphadenopathy.  Oral cavity: Oral canal is clear, uvula midline, no enlarged tonsils.  Lungs: Normal effort, CTA bilaterally, no wheezes, rhonchi, or rales  CV: Regular rate and rhythm. No murmurs, rubs, or gallops.  Breast:  Breasts examined seated and supine.  No skin changes, peau d'orange or nipple retraction.  No discharge.  Non-mobile, non-painful mass to left axilla at the tail approximate 1 cm in diameter    A chaperone was offered to the patient during today's exam. Chaperone name: Apolonia Driscoll MA was present.        Assessment & Plan:     44 y.o. female with the following -     1. Primary hypertension  Chronic exacerbated problem.  BP is improving.  Would recommend she continue with hydrochlorothiazide 25 mg daily and amlodipine-benazepril 10-40 mg daily.    2. Pre-operative clearance  Acute uncomplicated problem.  Would recommend patient complete her colonoscopy in hospital setting.  She had recent CBC and BMP completed.    3. Mass of left axilla  Acute uncomplicated problem.  Palpable lump to left axilla at the tail.  Diagnostic mammogram and ultrasound ordered.  - MA-DIAGNOSTIC MAMMO LEFT W/TOMOSYNTHESIS W/CAD; Future  - US-BREAST LIMITED-LEFT; Future    4. Vitamin D deficiency  Chronic exacerbated problem.  Complete additional 12-week course and recheck vitamin D level.  - VITAMIN D,25 HYDROXY (DEFICIENCY); Future    5. Morbid obesity with BMI of 50.0-59.9, adult (HCC)  Chronic exacerbated problem.  Encouraged diet and exercise.  - Patient identified as having weight " management issue.  Appropriate orders and counseling given.    6. Severe obesity (HCC)  7. Body mass index (BMI) of 50-59.9 in adult (HCC)  Chronic exacerbated problem.  Encouraged healthy diet and exercise.    8. Encounter to discuss test results  Acute uncomplicated problem.  Discussed and reviewed lab results from 12/9/2024.        HCC Gap Form    Diagnosis: E66.01 - Severe obesity (HCC)  Z68.43 - Body mass index (BMI) of 50-59.9 in adult (HCC)  The current BMI is 51.76 kg/m² as of 01/13/25.    Past BMI Values:  01/13/25 : 51.76 kg/m². 11/04/24 : 52.68 kg/m². 10/02/24 : 54.32 kg/m².   Assessment and plan: Chronic, improving. Encouraged healthy diet and physical activity changes with a goal of weight loss. Follow up at least annually.  Last edited 01/13/25 14:41 PST by Ro Clarke A.P.R.N.           Return in about 3 months (around 4/13/2025) for Hypertension.    Please note that this dictation was created using voice recognition software. I have made every reasonable attempt to correct obvious errors, but I expect that there are errors of grammar and possibly content that I did not discover before finalizing the note.

## 2025-02-03 ENCOUNTER — HOSPITAL ENCOUNTER (OUTPATIENT)
Dept: RADIOLOGY | Facility: MEDICAL CENTER | Age: 45
End: 2025-02-03
Attending: NURSE PRACTITIONER
Payer: COMMERCIAL

## 2025-02-03 DIAGNOSIS — R22.32 MASS OF LEFT AXILLA: ICD-10-CM

## 2025-02-03 PROCEDURE — G0279 TOMOSYNTHESIS, MAMMO: HCPCS

## 2025-02-03 PROCEDURE — 76642 ULTRASOUND BREAST LIMITED: CPT | Mod: LT

## 2025-02-08 DIAGNOSIS — I10 PRIMARY HYPERTENSION: ICD-10-CM

## 2025-02-10 RX ORDER — HYDROCHLOROTHIAZIDE 25 MG/1
25 TABLET ORAL DAILY
Qty: 90 TABLET | Refills: 0 | Status: SHIPPED | OUTPATIENT
Start: 2025-02-10

## 2025-02-10 NOTE — TELEPHONE ENCOUNTER
Received request via: Pharmacy    Was the patient seen in the last year in this department? Yes    Does the patient have an active prescription (recently filled or refills available) for medication(s) requested? No    [facility]    Does the [facility] have [facility] and need 100-day supply? (This applies to ALL medications) Patient does not have SCP

## 2025-02-11 NOTE — TELEPHONE ENCOUNTER
Requested Prescriptions     Signed Prescriptions Disp Refills    hydroCHLOROthiazide 25 MG Tab 90 Tablet 0     Sig: TAKE ONE TABLET BY MOUTH ONE TIME DAILY     Authorizing Provider: LORIN RUSHING A.P.R.N.

## 2025-04-14 ENCOUNTER — OFFICE VISIT (OUTPATIENT)
Dept: MEDICAL GROUP | Facility: PHYSICIAN GROUP | Age: 45
End: 2025-04-14
Payer: COMMERCIAL

## 2025-04-14 VITALS
OXYGEN SATURATION: 95 % | DIASTOLIC BLOOD PRESSURE: 86 MMHG | BODY MASS INDEX: 52.26 KG/M2 | HEART RATE: 71 BPM | HEIGHT: 62 IN | SYSTOLIC BLOOD PRESSURE: 136 MMHG | TEMPERATURE: 98.1 F | WEIGHT: 284 LBS

## 2025-04-14 DIAGNOSIS — I10 PRIMARY HYPERTENSION: ICD-10-CM

## 2025-04-14 DIAGNOSIS — E55.9 VITAMIN D DEFICIENCY: ICD-10-CM

## 2025-04-14 PROCEDURE — 99214 OFFICE O/P EST MOD 30 MIN: CPT | Performed by: NURSE PRACTITIONER

## 2025-04-14 PROCEDURE — 3079F DIAST BP 80-89 MM HG: CPT | Performed by: NURSE PRACTITIONER

## 2025-04-14 PROCEDURE — 3075F SYST BP GE 130 - 139MM HG: CPT | Performed by: NURSE PRACTITIONER

## 2025-04-14 RX ORDER — LIDOCAINE HYDROCHLORIDE 20 MG/ML
SOLUTION OROPHARYNGEAL
COMMUNITY
Start: 2025-04-04 | End: 2025-04-14

## 2025-04-14 RX ORDER — HYDROCHLOROTHIAZIDE 25 MG/1
25 TABLET ORAL DAILY
Qty: 90 TABLET | Refills: 3 | Status: SHIPPED | OUTPATIENT
Start: 2025-04-14

## 2025-04-14 RX ORDER — AMLODIPINE AND BENAZEPRIL HYDROCHLORIDE 10; 40 MG/1; MG/1
1 CAPSULE ORAL DAILY
Qty: 90 CAPSULE | Refills: 3 | Status: SHIPPED | OUTPATIENT
Start: 2025-04-14

## 2025-04-14 ASSESSMENT — FIBROSIS 4 INDEX: FIB4 SCORE: 1.22

## 2025-04-14 NOTE — ASSESSMENT & PLAN NOTE
Completed 12-week course of ergocalciferol 50,000 units. She had vitamin D level 3/24/2025 was 37.8 ng/mL from [facility].  Discussion to start over-the-counter vitamin D3 2000 units daily.

## 2025-04-14 NOTE — PROGRESS NOTES
Subjective:     CC: Hypertension follow-up    HPI:   Stacy presents today with the following:    Hypertension  BP today 136/86. Continues amlodipine-benazepril 10-40 mg daily.  Reports that she has not been taking hydrochlorothiazide as pharmacy requested prescription and our office declining. No home BP monitoring.  Denies chest pain, shortness of breath, dizziness, blurry vision or headaches.  Plan to resume hydrochlorothiazide dosing continue amlodipine-benazepril.  She will be obtaining new insurance and is not excepted here and will need new PCP.  She is asking for medication refills until she can establish with new provider.    Vitamin D deficiency  Completed 12-week course of ergocalciferol 50,000 units. She had vitamin D level 3/24/2025 was 37.8 ng/mL from [facility].  Discussion to start over-the-counter vitamin D3 2000 units daily.    Past Medical History:   Diagnosis Date    Hypertension        Social History     Tobacco Use    Smoking status: Never    Smokeless tobacco: Never   Vaping Use    Vaping status: Never Used   Substance Use Topics    Alcohol use: Not Currently    Drug use: Not Currently       Current Outpatient Medications Ordered in Epic   Medication Sig Dispense Refill    hydroCHLOROthiazide 25 MG Tab Take 1 Tablet by mouth every day. 90 Tablet 3    amlodipine-benazepril (LOTREL) 10-40 MG per capsule Take 1 Capsule by mouth every day. 90 Capsule 3    famotidine (PEPCID) 40 MG Tab 60 Tablets.      Ferrous Gluconate (IRON 27 PO) Take  by mouth.      lansoprazole (PREVACID) 30 MG CAPSULE DELAYED RELEASE Take 30 mg by mouth every day.      calcium carbonate (TUMS) 500 MG Chew Tab Chew every day. 8-10/day       No current [facility]-ordered facility-administered medications on file.       Allergies:  Sulfa drugs    Health Maintenance: Declines vaccinations today      Objective:     Vital signs reviewed  Exam:  /86 (BP Location: Right arm, Patient Position: Sitting, BP Cuff Size: Adult)    "Pulse 71   Temp 36.7 °C (98.1 °F) (Temporal)   Ht 1.575 m (5' 2\")   Wt (!) 129 kg (284 lb)   LMP 03/24/2025 (Approximate)   SpO2 95%   BMI 51.94 kg/m²  Body mass index is 51.94 kg/m².    Gen: Alert and oriented, No apparent distress.  Increased body habitus.  Lungs: Normal effort, CTA bilaterally, no wheezes, rhonchi, or rales  CV: Regular rate and rhythm. No murmurs, rubs, or gallops.      Assessment & Plan:     44 y.o. female with the following -     1. Primary hypertension  Chronic stable problem.  Continue amlodipine-benazepril 10-40 mg daily and hydrochlorothiazide 25 mg daily.  - hydroCHLOROthiazide 25 MG Tab; Take 1 Tablet by mouth every day.  Dispense: 90 Tablet; Refill: 3  - amlodipine-benazepril (LOTREL) 10-40 MG per capsule; Take 1 Capsule by mouth every day.  Dispense: 90 Capsule; Refill: 3    2. Vitamin D deficiency  Chronic stable problem.  Start over-the-counter vitamin D3 2000 units daily.  Discussed and reviewed lab results from 3/25/2025.    Return if symptoms worsen or fail to improve.    Please note that this dictation was created using voice recognition software. I have made every reasonable attempt to correct obvious errors, but I expect that there are errors of grammar and possibly content that I did not discover before finalizing the note.        "

## 2025-04-14 NOTE — ASSESSMENT & PLAN NOTE
BP today 136/86. Continues amlodipine-benazepril 10-40 mg daily.  Reports that she has not been taking hydrochlorothiazide as pharmacy requested prescription and our office declining. No home BP monitoring.  Denies chest pain, shortness of breath, dizziness, blurry vision or headaches.  Plan to resume hydrochlorothiazide dosing continue amlodipine-benazepril.  She will be obtaining new insurance and is not excepted here and will need new PCP.  She is asking for medication refills until she can establish with new provider.